# Patient Record
Sex: FEMALE | HISPANIC OR LATINO | Employment: UNEMPLOYED | URBAN - NONMETROPOLITAN AREA
[De-identification: names, ages, dates, MRNs, and addresses within clinical notes are randomized per-mention and may not be internally consistent; named-entity substitution may affect disease eponyms.]

---
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
## 2025-01-08 ENCOUNTER — OFFICE VISIT (OUTPATIENT)
Dept: FAMILY MEDICINE CLINIC | Facility: CLINIC | Age: 20
End: 2025-01-08
Payer: COMMERCIAL

## 2025-01-08 VITALS
HEART RATE: 72 BPM | TEMPERATURE: 96.7 F | DIASTOLIC BLOOD PRESSURE: 68 MMHG | BODY MASS INDEX: 29.42 KG/M2 | OXYGEN SATURATION: 98 % | SYSTOLIC BLOOD PRESSURE: 114 MMHG | WEIGHT: 176.8 LBS

## 2025-01-08 DIAGNOSIS — S21.001A: Primary | ICD-10-CM

## 2025-01-08 PROCEDURE — 99214 OFFICE O/P EST MOD 30 MIN: CPT | Performed by: FAMILY MEDICINE

## 2025-01-08 NOTE — PROGRESS NOTES
Name: Angel Up      : 2005      MRN: [id number]  Encounter Provider: Sean Rush MD  Encounter Date: 2025   Encounter department: [facility]  :  Assessment & Plan  Open wound of right breast without complication, initial encounter  S/p breast reduction on 2024  Open wound after removing a stitch at home and noticed some yellow drainage.   No pus drainage today. Scant blood drained. No fevers, chills.   1 steri strip applied.   Pt can change steri strips in 24 hr and use as needed. Should not require more than 3-4 days worth.              History of Present Illness     Pt had a breast reduction surgery in [facility] in 2024. One of her stitches came off on her Right breast and started to bleed a little and noted a small amount of yellow discharge. No fevers, chills or pain. No other complaints and pt plans to go to [place] for a surgery f/u.     Applied 1 steri strip today.         Review of Systems   Constitutional:  Negative for chills and fever.   Skin:  Positive for wound (Right breast open wound).       Objective   /68   Pulse 72   Temp (!) 96.7 °F (35.9 °C)   Wt 80.2 kg (176 lb 12.8 oz)   SpO2 98%   BMI 29.42 kg/m²      Physical Exam  Vitals reviewed.   Constitutional:       General: She is not in acute distress.     Appearance: Normal appearance. She is not ill-appearing.   Cardiovascular:      Rate and Rhythm: Normal rate.   Pulmonary:      Effort: Pulmonary effort is normal.   Skin:     Findings: Lesion (Right breast 1mm x 1mm wound. Sanguinous fluid noted, no pus. NonTTP.) present.   Neurological:      Mental Status: She is alert.   Psychiatric:         Mood and Affect: Mood normal.         Behavior: Behavior normal.

## 2025-02-24 ENCOUNTER — TELEPHONE (OUTPATIENT)
Dept: FAMILY MEDICINE CLINIC | Facility: CLINIC | Age: 20
End: 2025-02-24

## 2025-02-24 NOTE — TELEPHONE ENCOUNTER
Called patient to reschedule appointment on 3/3/25 due to Dr. Mistry not being in the office on Mondays. Patient did not answer, left a voicemail to reschedule.     Please reschedule with resident or physician assistant, Maurizio. They have more availability to be seen sooner.